# Patient Record
Sex: MALE
[De-identification: names, ages, dates, MRNs, and addresses within clinical notes are randomized per-mention and may not be internally consistent; named-entity substitution may affect disease eponyms.]

---

## 2018-03-08 ENCOUNTER — HOSPITAL ENCOUNTER (EMERGENCY)
Dept: HOSPITAL 31 - C.ER | Age: 28
Discharge: LEFT BEFORE DISCHARGE | End: 2018-03-08
Payer: MEDICAID

## 2018-03-08 VITALS
OXYGEN SATURATION: 99 % | RESPIRATION RATE: 18 BRPM | TEMPERATURE: 97.9 F | SYSTOLIC BLOOD PRESSURE: 130 MMHG | HEART RATE: 99 BPM | DIASTOLIC BLOOD PRESSURE: 85 MMHG

## 2018-03-08 VITALS — BODY MASS INDEX: 27.4 KG/M2

## 2018-03-08 DIAGNOSIS — Z02.89: Primary | ICD-10-CM

## 2018-03-08 DIAGNOSIS — M54.9: ICD-10-CM

## 2018-03-10 NOTE — C.PDOC
- HPI


Time Seen by Provider: 03/08/18 09:46


Chief Complaint (Nursing): Trauma





Past Medical History


Vital Signs: 





 Last Vital Signs











Temp  97.9 F   03/08/18 09:33


 


Pulse  99 H  03/08/18 09:33


 


Resp  18   03/08/18 09:33


 


BP  130/85   03/08/18 09:33


 


Pulse Ox  99   03/08/18 09:33














- Medical History


PMH: Anemia, Anxiety, Back Problems, Bipolar Disorder, Depression, Post 

Traumatic Stress Disorder


   Denies: Chronic Kidney Disease


Family History: States: Unknown Family Hx





- Social History


Hx Tobacco Use: Yes (OCC)


Hx Alcohol Use: Yes


Hx Substance Use: No





- Immunization History


Hx Tetanus Toxoid Vaccination: No


Hx Influenza Vaccination: No


Hx Pneumococcal Vaccination: No





ED Course And Treatment


O2 Sat by Pulse Oximetry: 99





Disposition





- Disposition


Disposition Time: 10:01


Condition: UNKNOWN





- Clinical Impression


Clinical Impression: 


 Patient left without being seen

## 2018-06-23 ENCOUNTER — HOSPITAL ENCOUNTER (EMERGENCY)
Dept: HOSPITAL 31 - C.ER | Age: 28
Discharge: LEFT BEFORE DISCHARGE | End: 2018-06-23
Payer: COMMERCIAL

## 2018-06-23 VITALS
TEMPERATURE: 97.7 F | SYSTOLIC BLOOD PRESSURE: 115 MMHG | RESPIRATION RATE: 20 BRPM | DIASTOLIC BLOOD PRESSURE: 81 MMHG | HEART RATE: 111 BPM

## 2018-06-23 VITALS — OXYGEN SATURATION: 98 %

## 2018-06-23 VITALS — BODY MASS INDEX: 27.4 KG/M2
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
## 2018-06-23 DIAGNOSIS — S09.90XA: Primary | ICD-10-CM

## 2018-06-23 DIAGNOSIS — S61.212A: ICD-10-CM

## 2018-06-23 NOTE — RAD
PROCEDURE:  Right Hand Radiographs.



HISTORY:

injury



COMPARISON:

None.



FINDINGS:



BONES:

Bone alignment and mineralization are normal.  There is no acute 

displaced fracture or bone destruction.



JOINTS:

Normal. 



SOFT TISSUES:

Normal. 



OTHER FINDINGS:

None.



IMPRESSION:

No acute fracture or dislocation.
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
## 2018-12-28 ENCOUNTER — HOSPITAL ENCOUNTER (EMERGENCY)
Dept: HOSPITAL 31 - C.ER | Age: 28
Discharge: LEFT BEFORE DISCHARGE | End: 2018-12-28
Payer: COMMERCIAL

## 2018-12-28 VITALS — RESPIRATION RATE: 17 BRPM | TEMPERATURE: 98 F | HEART RATE: 85 BPM | OXYGEN SATURATION: 98 %

## 2018-12-28 VITALS — BODY MASS INDEX: 27.4 KG/M2

## 2018-12-28 VITALS — SYSTOLIC BLOOD PRESSURE: 122 MMHG | DIASTOLIC BLOOD PRESSURE: 72 MMHG

## 2018-12-28 DIAGNOSIS — R07.9: Primary | ICD-10-CM

## 2018-12-28 NOTE — C.PDOC
History Of Present Illness





Patient reports several day history of intermittent left sided chest pain with 

left arm numbness. Denies dyspnea. States that pain occurs at random, no 

exacerbating or alleviating factors. He states that he has a history of anxiety 

and thinks this could be related, but he is also concerned because he has a 

history of Hodgkin's lymphoma years ago, and had a chest port that was removed 

five years ago. No fever, nausea, vomiting. He does feel fatigued. Fingerstick 

at triage was in the 50's, food was provided.


Time Seen by Provider: 12/28/18 09:07


Chief Complaint (Nursing): Chest Pain





Past Medical History


Reviewed: Historical Data, Nursing Documentation, Vital Signs


Vital Signs: 





                                Last Vital Signs











Temp  98 F   12/28/18 08:57


 


Pulse  85   12/28/18 08:57


 


Resp  17   12/28/18 08:57


 


BP  122/72   12/28/18 09:23


 


Pulse Ox  98   12/28/18 08:57














- Medical History


PMH: Anemia, Anxiety, Back Problems, Bipolar Disorder, Depression, Post Trauma

tic Stress Disorder


   Denies: Chronic Kidney Disease


Family History: States: Unknown Family Hx





- Social History


Hx Tobacco Use: Yes (OCC)


Hx Alcohol Use: Yes


Hx Substance Use: Yes ([facility])





- Immunization History


Hx Tetanus Toxoid Vaccination: Yes


Hx Influenza Vaccination: No


Hx Pneumococcal Vaccination: No





Review Of Systems


Except As Marked, All Systems Reviewed And Found Negative.


Constitutional: Negative for: Fever, Chills


Cardiovascular: Positive for: Chest Pain.  Negative for: Palpitations


Respiratory: Negative for: Cough, Shortness of Breath, SOB with Excertion


Gastrointestinal: Negative for: Nausea, Vomiting, Abdominal Pain, Diarrhea


Musculoskeletal: Positive for: Arm Pain


Neurological: Positive for: Weakness





Physical Exam





- Physical Exam


Appears: Well, Non-toxic, No Acute Distress


Skin: Normal Color, Warm, Dry


Head: Atraumatic, Normacephalic


Eye(s): bilateral: Normal Inspection


Oral Mucosa: Moist


Chest: Symmetrical


Cardiovascular: Rhythm Regular, No Edema, No Friction Rub, No Murmur


Respiratory: Normal Breath Sounds


Gastrointestinal/Abdominal: Normal Exam


Extremity: Normal ROM, No Tenderness


Neurological/Psych: Oriented x3


Gait: Steady





ED Course And Treatment


O2 Sat by Pulse Oximetry: 98





Medical Decision Making


Medical Decision Making: 





Labs and IV toradol ordered.





1000- RN went to place IV but patient eloped.





Disposition





- Disposition


Disposition: ELOPEMENT - ER ONLY


Disposition Time: 10:00


Condition: STABLE





- Clinical Impression


Clinical Impression: 


 Chest pain

## 2019-02-22 ENCOUNTER — HOSPITAL ENCOUNTER (EMERGENCY)
Dept: HOSPITAL 31 - C.ER | Age: 29
Discharge: HOME | End: 2019-02-22
Payer: COMMERCIAL

## 2019-02-22 VITALS
OXYGEN SATURATION: 97 % | RESPIRATION RATE: 16 BRPM | TEMPERATURE: 98.4 F | SYSTOLIC BLOOD PRESSURE: 144 MMHG | DIASTOLIC BLOOD PRESSURE: 79 MMHG | HEART RATE: 117 BPM

## 2019-02-22 VITALS — BODY MASS INDEX: 27.4 KG/M2
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
## 2019-02-22 DIAGNOSIS — S97.81XA: Primary | ICD-10-CM

## 2019-02-22 NOTE — RAD
Date of service: 



02/22/2019



PROCEDURE:  Right Foot Radiographs.



HISTORY:

 foot injury 



COMPARISON:

None.



FINDINGS:



BONES:

Bone alignment and mineralization are normal.  There is no acute 

displaced fracture or bone destruction.



JOINTS:

Normal. 



SOFT TISSUES:

Normal. 



OTHER FINDINGS:

None.



IMPRESSION:

No acute displaced fracture or dislocation.
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
